# Patient Record
Sex: FEMALE | Race: WHITE
[De-identification: names, ages, dates, MRNs, and addresses within clinical notes are randomized per-mention and may not be internally consistent; named-entity substitution may affect disease eponyms.]

---

## 2020-10-16 ENCOUNTER — HOSPITAL ENCOUNTER (EMERGENCY)
Dept: HOSPITAL 8 - ED | Age: 70
Discharge: HOME | End: 2020-10-16
Payer: MEDICARE

## 2020-10-16 VITALS — DIASTOLIC BLOOD PRESSURE: 76 MMHG | SYSTOLIC BLOOD PRESSURE: 184 MMHG

## 2020-10-16 VITALS — HEIGHT: 63 IN | WEIGHT: 191.8 LBS | BODY MASS INDEX: 33.98 KG/M2

## 2020-10-16 DIAGNOSIS — E78.5: ICD-10-CM

## 2020-10-16 DIAGNOSIS — F17.200: ICD-10-CM

## 2020-10-16 DIAGNOSIS — M12.562: Primary | ICD-10-CM

## 2020-10-16 DIAGNOSIS — M12.551: ICD-10-CM

## 2020-10-16 DIAGNOSIS — E11.9: ICD-10-CM

## 2020-10-16 DIAGNOSIS — M12.561: ICD-10-CM

## 2020-10-16 DIAGNOSIS — I10: ICD-10-CM

## 2020-10-16 PROCEDURE — 99283 EMERGENCY DEPT VISIT LOW MDM: CPT

## 2020-10-16 PROCEDURE — 96372 THER/PROPH/DIAG INJ SC/IM: CPT

## 2021-05-05 ENCOUNTER — HOSPITAL ENCOUNTER (INPATIENT)
Dept: HOSPITAL 8 - 3E | Age: 71
LOS: 4 days | Discharge: LEFT BEFORE DISCHARGE | DRG: 885 | End: 2021-05-09
Attending: PSYCHIATRY & NEUROLOGY | Admitting: PSYCHIATRY & NEUROLOGY
Payer: MEDICARE

## 2021-05-05 VITALS — WEIGHT: 204.15 LBS | HEIGHT: 63 IN | BODY MASS INDEX: 36.17 KG/M2

## 2021-05-05 DIAGNOSIS — F15.20: ICD-10-CM

## 2021-05-05 DIAGNOSIS — Z79.82: ICD-10-CM

## 2021-05-05 DIAGNOSIS — F17.200: ICD-10-CM

## 2021-05-05 DIAGNOSIS — R09.02: ICD-10-CM

## 2021-05-05 DIAGNOSIS — Z79.4: ICD-10-CM

## 2021-05-05 DIAGNOSIS — I25.10: ICD-10-CM

## 2021-05-05 DIAGNOSIS — J98.11: ICD-10-CM

## 2021-05-05 DIAGNOSIS — G92: ICD-10-CM

## 2021-05-05 DIAGNOSIS — F11.20: ICD-10-CM

## 2021-05-05 DIAGNOSIS — E11.9: ICD-10-CM

## 2021-05-05 DIAGNOSIS — N39.0: ICD-10-CM

## 2021-05-05 DIAGNOSIS — Z79.899: ICD-10-CM

## 2021-05-05 DIAGNOSIS — E66.9: ICD-10-CM

## 2021-05-05 DIAGNOSIS — E78.5: ICD-10-CM

## 2021-05-05 DIAGNOSIS — I10: ICD-10-CM

## 2021-05-05 DIAGNOSIS — F29: Primary | ICD-10-CM

## 2021-05-05 DIAGNOSIS — Z53.29: ICD-10-CM

## 2021-05-05 DIAGNOSIS — Z99.3: ICD-10-CM

## 2021-05-05 PROCEDURE — 36415 COLL VENOUS BLD VENIPUNCTURE: CPT

## 2021-05-05 PROCEDURE — 87086 URINE CULTURE/COLONY COUNT: CPT

## 2021-05-05 PROCEDURE — 87077 CULTURE AEROBIC IDENTIFY: CPT

## 2021-05-05 PROCEDURE — 80061 LIPID PANEL: CPT

## 2021-05-05 PROCEDURE — 84439 ASSAY OF FREE THYROXINE: CPT

## 2021-05-05 PROCEDURE — 82962 GLUCOSE BLOOD TEST: CPT

## 2021-05-05 PROCEDURE — 87186 SC STD MICRODIL/AGAR DIL: CPT

## 2021-05-05 PROCEDURE — 87184 SC STD DISK METHOD PER PLATE: CPT

## 2021-05-05 PROCEDURE — 82140 ASSAY OF AMMONIA: CPT

## 2021-05-05 PROCEDURE — 71045 X-RAY EXAM CHEST 1 VIEW: CPT

## 2021-05-05 PROCEDURE — 85025 COMPLETE CBC W/AUTO DIFF WBC: CPT

## 2021-05-05 PROCEDURE — 81001 URINALYSIS AUTO W/SCOPE: CPT

## 2021-05-05 PROCEDURE — 84443 ASSAY THYROID STIM HORMONE: CPT

## 2021-05-05 PROCEDURE — 80053 COMPREHEN METABOLIC PANEL: CPT

## 2021-05-05 PROCEDURE — 82607 VITAMIN B-12: CPT

## 2021-05-06 VITALS — DIASTOLIC BLOOD PRESSURE: 90 MMHG | SYSTOLIC BLOOD PRESSURE: 160 MMHG

## 2021-05-06 VITALS — SYSTOLIC BLOOD PRESSURE: 153 MMHG | DIASTOLIC BLOOD PRESSURE: 89 MMHG

## 2021-05-06 VITALS — DIASTOLIC BLOOD PRESSURE: 78 MMHG | SYSTOLIC BLOOD PRESSURE: 111 MMHG

## 2021-05-06 VITALS — SYSTOLIC BLOOD PRESSURE: 111 MMHG | DIASTOLIC BLOOD PRESSURE: 78 MMHG

## 2021-05-06 VITALS — SYSTOLIC BLOOD PRESSURE: 161 MMHG | DIASTOLIC BLOOD PRESSURE: 86 MMHG

## 2021-05-06 LAB
ALBUMIN SERPL-MCNC: 3.8 G/DL (ref 3.4–5)
ALP SERPL-CCNC: 120 U/L (ref 45–117)
ALT SERPL-CCNC: 19 U/L (ref 12–78)
ANION GAP SERPL CALC-SCNC: 3 MMOL/L (ref 5–15)
BASOPHILS # BLD AUTO: 0 X10^3/UL (ref 0–0.1)
BASOPHILS NFR BLD AUTO: 0 % (ref 0–1)
BILIRUB SERPL-MCNC: 0.6 MG/DL (ref 0.2–1)
CALCIUM SERPL-MCNC: 9.5 MG/DL (ref 8.5–10.1)
CHLORIDE SERPL-SCNC: 103 MMOL/L (ref 98–107)
CHOL/HDL RATIO: 2.3
CREAT SERPL-MCNC: 1.05 MG/DL (ref 0.55–1.02)
EOSINOPHIL # BLD AUTO: 0.3 X10^3/UL (ref 0–0.4)
EOSINOPHIL NFR BLD AUTO: 4 % (ref 1–7)
ERYTHROCYTE [DISTWIDTH] IN BLOOD BY AUTOMATED COUNT: 14.5 % (ref 9.6–15.2)
HDL CHOL %: 44 % (ref 28–40)
HDL CHOLESTEROL (DIRECT): 82 MG/DL (ref 40–60)
LDL CHOLESTEROL,CALCULATED: 70 MG/DL (ref 54–169)
LDLC/HDLC SERPL: 0.9 {RATIO} (ref 0.5–3)
LYMPHOCYTES # BLD AUTO: 0.6 X10^3/UL (ref 1–3.4)
LYMPHOCYTES NFR BLD AUTO: 7 % (ref 22–44)
MCH RBC QN AUTO: 28.9 PG (ref 27–34.8)
MCHC RBC AUTO-ENTMCNC: 32.9 G/DL (ref 32.4–35.8)
MD: NO
MICROSCOPIC: (no result)
MONOCYTES # BLD AUTO: 0.8 X10^3/UL (ref 0.2–0.8)
MONOCYTES NFR BLD AUTO: 10 % (ref 2–9)
NEUTROPHILS # BLD AUTO: 6.7 X10^3/UL (ref 1.8–6.8)
NEUTROPHILS NFR BLD AUTO: 79 % (ref 42–75)
PLATELET # BLD AUTO: 169 X10^3/UL (ref 130–400)
PMV BLD AUTO: 8.8 FL (ref 7.4–10.4)
PROT SERPL-MCNC: 7.6 G/DL (ref 6.4–8.2)
RBC # BLD AUTO: 5.2 X10^6/UL (ref 3.82–5.3)
T4 FREE SERPL-MCNC: 1.33 NG/DL (ref 0.76–1.46)
TRIGL SERPL-MCNC: 181 MG/DL (ref 50–200)
VLDLC SERPL CALC-MCNC: 36 MG/DL (ref 0–25)

## 2021-05-06 RX ADMIN — PROPRANOLOL HYDROCHLORIDE SCH MG: 10 TABLET ORAL at 20:50

## 2021-05-06 RX ADMIN — INSULIN GLARGINE SCH UNITS: 100 INJECTION, SOLUTION SUBCUTANEOUS at 20:48

## 2021-05-06 RX ADMIN — NITROFURANTOIN (MONOHYDRATE/MACROCRYSTALS) SCH MG: 75; 25 CAPSULE ORAL at 20:50

## 2021-05-06 RX ADMIN — INSULIN LISPRO SCH UNITS: 100 INJECTION, SOLUTION INTRAVENOUS; SUBCUTANEOUS at 20:47

## 2021-05-06 RX ADMIN — SIMVASTATIN SCH MG: 40 TABLET, FILM COATED ORAL at 20:50

## 2021-05-06 RX ADMIN — INSULIN LISPRO SCH UNITS: 100 INJECTION, SOLUTION INTRAVENOUS; SUBCUTANEOUS at 16:00

## 2021-05-06 NOTE — NUR
HIRA VASQUEZ-Fall Risk Medications NOT present and NOT receiving anticoagulants.

-------------------------------------------------------------------------------

Signed:    05/06/21 at 1136 by Mireya CONNOLLY

-------------------------------------------------------------------------------

## 2021-05-07 VITALS — DIASTOLIC BLOOD PRESSURE: 83 MMHG | SYSTOLIC BLOOD PRESSURE: 158 MMHG

## 2021-05-07 VITALS — SYSTOLIC BLOOD PRESSURE: 119 MMHG | DIASTOLIC BLOOD PRESSURE: 78 MMHG

## 2021-05-07 VITALS — DIASTOLIC BLOOD PRESSURE: 70 MMHG | SYSTOLIC BLOOD PRESSURE: 105 MMHG

## 2021-05-07 RX ADMIN — ZIPRASIDONE HCL SCH MG: 20 CAPSULE ORAL at 19:57

## 2021-05-07 RX ADMIN — INSULIN LISPRO SCH UNITS: 100 INJECTION, SOLUTION INTRAVENOUS; SUBCUTANEOUS at 07:00

## 2021-05-07 RX ADMIN — CEPHALEXIN SCH MG: 500 CAPSULE ORAL at 19:57

## 2021-05-07 RX ADMIN — CEPHALEXIN SCH MG: 500 CAPSULE ORAL at 16:04

## 2021-05-07 RX ADMIN — INSULIN LISPRO SCH UNITS: 100 INJECTION, SOLUTION INTRAVENOUS; SUBCUTANEOUS at 12:19

## 2021-05-07 RX ADMIN — PROPRANOLOL HYDROCHLORIDE SCH MG: 10 TABLET ORAL at 19:57

## 2021-05-07 RX ADMIN — INSULIN LISPRO SCH UNITS: 100 INJECTION, SOLUTION INTRAVENOUS; SUBCUTANEOUS at 17:15

## 2021-05-07 RX ADMIN — SIMVASTATIN SCH MG: 40 TABLET, FILM COATED ORAL at 19:57

## 2021-05-07 RX ADMIN — LISINOPRIL SCH MG: 20 TABLET ORAL at 09:04

## 2021-05-07 RX ADMIN — INSULIN LISPRO SCH UNITS: 100 INJECTION, SOLUTION INTRAVENOUS; SUBCUTANEOUS at 21:06

## 2021-05-07 RX ADMIN — NITROFURANTOIN (MONOHYDRATE/MACROCRYSTALS) SCH MG: 75; 25 CAPSULE ORAL at 09:04

## 2021-05-07 RX ADMIN — INSULIN GLARGINE SCH UNITS: 100 INJECTION, SOLUTION SUBCUTANEOUS at 21:06

## 2021-05-07 RX ADMIN — PROPRANOLOL HYDROCHLORIDE SCH MG: 10 TABLET ORAL at 09:03

## 2021-05-07 RX ADMIN — INSULIN GLARGINE SCH UNITS: 100 INJECTION, SOLUTION SUBCUTANEOUS at 09:03

## 2021-05-08 VITALS — DIASTOLIC BLOOD PRESSURE: 73 MMHG | SYSTOLIC BLOOD PRESSURE: 114 MMHG

## 2021-05-08 VITALS — SYSTOLIC BLOOD PRESSURE: 146 MMHG | DIASTOLIC BLOOD PRESSURE: 80 MMHG

## 2021-05-08 RX ADMIN — CIPROFLOXACIN HYDROCHLORIDE SCH MG: 500 TABLET, FILM COATED ORAL at 20:23

## 2021-05-08 RX ADMIN — INSULIN LISPRO SCH UNITS: 100 INJECTION, SOLUTION INTRAVENOUS; SUBCUTANEOUS at 07:00

## 2021-05-08 RX ADMIN — INSULIN LISPRO SCH UNITS: 100 INJECTION, SOLUTION INTRAVENOUS; SUBCUTANEOUS at 12:13

## 2021-05-08 RX ADMIN — ZIPRASIDONE HCL SCH MG: 20 CAPSULE ORAL at 20:23

## 2021-05-08 RX ADMIN — LISINOPRIL SCH MG: 20 TABLET ORAL at 08:34

## 2021-05-08 RX ADMIN — INSULIN LISPRO SCH UNITS: 100 INJECTION, SOLUTION INTRAVENOUS; SUBCUTANEOUS at 17:14

## 2021-05-08 RX ADMIN — INSULIN GLARGINE SCH UNITS: 100 INJECTION, SOLUTION SUBCUTANEOUS at 20:22

## 2021-05-08 RX ADMIN — ZIPRASIDONE HCL SCH MG: 20 CAPSULE ORAL at 08:34

## 2021-05-08 RX ADMIN — CEPHALEXIN SCH MG: 500 CAPSULE ORAL at 12:12

## 2021-05-08 RX ADMIN — SIMVASTATIN SCH MG: 40 TABLET, FILM COATED ORAL at 20:23

## 2021-05-08 RX ADMIN — INSULIN GLARGINE SCH UNITS: 100 INJECTION, SOLUTION SUBCUTANEOUS at 08:36

## 2021-05-08 RX ADMIN — CEPHALEXIN SCH MG: 500 CAPSULE ORAL at 06:09

## 2021-05-08 RX ADMIN — PROPRANOLOL HYDROCHLORIDE SCH MG: 10 TABLET ORAL at 08:34

## 2021-05-08 RX ADMIN — INSULIN LISPRO SCH UNITS: 100 INJECTION, SOLUTION INTRAVENOUS; SUBCUTANEOUS at 20:22

## 2021-05-08 RX ADMIN — PROPRANOLOL HYDROCHLORIDE SCH MG: 10 TABLET ORAL at 20:23

## 2021-05-09 VITALS — SYSTOLIC BLOOD PRESSURE: 98 MMHG | DIASTOLIC BLOOD PRESSURE: 70 MMHG

## 2021-05-09 VITALS — DIASTOLIC BLOOD PRESSURE: 73 MMHG | SYSTOLIC BLOOD PRESSURE: 120 MMHG

## 2021-05-09 RX ADMIN — ZIPRASIDONE HCL SCH MG: 20 CAPSULE ORAL at 07:59

## 2021-05-09 RX ADMIN — CIPROFLOXACIN HYDROCHLORIDE SCH MG: 500 TABLET, FILM COATED ORAL at 07:59

## 2021-05-09 RX ADMIN — PROPRANOLOL HYDROCHLORIDE SCH MG: 10 TABLET ORAL at 07:59

## 2021-05-09 RX ADMIN — LISINOPRIL SCH MG: 20 TABLET ORAL at 07:59

## 2021-05-09 RX ADMIN — INSULIN GLARGINE SCH UNITS: 100 INJECTION, SOLUTION SUBCUTANEOUS at 08:07

## 2021-05-09 RX ADMIN — INSULIN LISPRO SCH UNITS: 100 INJECTION, SOLUTION INTRAVENOUS; SUBCUTANEOUS at 11:50

## 2021-05-09 RX ADMIN — INSULIN LISPRO SCH UNITS: 100 INJECTION, SOLUTION INTRAVENOUS; SUBCUTANEOUS at 07:57
